# Patient Record
Sex: MALE | Race: WHITE | NOT HISPANIC OR LATINO | Employment: FULL TIME | URBAN - METROPOLITAN AREA
[De-identification: names, ages, dates, MRNs, and addresses within clinical notes are randomized per-mention and may not be internally consistent; named-entity substitution may affect disease eponyms.]

---

## 2022-08-15 ENCOUNTER — OFFICE VISIT (OUTPATIENT)
Dept: PODIATRY | Facility: CLINIC | Age: 22
End: 2022-08-15

## 2022-08-15 VITALS — HEIGHT: 73 IN | HEART RATE: 78 BPM | BODY MASS INDEX: 34.46 KG/M2 | WEIGHT: 260 LBS | OXYGEN SATURATION: 97 %

## 2022-08-15 DIAGNOSIS — M25.372 ANKLE INSTABILITY, LEFT: ICD-10-CM

## 2022-08-15 DIAGNOSIS — M25.572 ACUTE LEFT ANKLE PAIN: Primary | ICD-10-CM

## 2022-08-15 DIAGNOSIS — S93.402S MODERATE LEFT ANKLE SPRAIN, SEQUELA: ICD-10-CM

## 2022-08-15 PROCEDURE — 99203 OFFICE O/P NEW LOW 30 MIN: CPT | Performed by: PODIATRIST

## 2022-08-15 NOTE — PROGRESS NOTES
"08/15/2022  Foot and Ankle Surgery - New Patient   Provider: Dr. González Levi DPM  Location: [facility]    Subjective:  Og Bonilla is a 21 y.o. male.     Chief Complaint   Patient presents with   • Left Ankle - Pain   • Initial Evaluation     No pcp        HPI: The patient is a 21 year old male who presents today with left ankle pain. He reports weakness in his left ankle. He notes he has rolled his ankle approximately 10 times over the past 2 weeks, he notes this primarily occurs when he is \"jumping down from things\". He works with semi trucks and trailers. He noticed the onset of his symptoms about a month ago, he was playing friIcaruse, jumped up to catch it and landed wrong on his left ankle. He denies any ankle sprains or pain prior to this incident. The patient was evaluated at the urgent care on 06/20/2022, he had a x-ray of his left ankle. Based on the x-ray from the urgent care, there does not appear to be any fractures or dislocations. He reports when he initially injured his ankle he had bruising, swelling, pain, and difficulty ambulating. The patient wears an \"Ace brace\" and work boots that lace up above his ankle. He denies any significant pain on a daily basis.     No Known Allergies    History reviewed. No pertinent past medical history.    History reviewed. No pertinent surgical history.    History reviewed. No pertinent family history.    Social History     Socioeconomic History   • Marital status: Single   Tobacco Use   • Smoking status: Never Smoker   • Smokeless tobacco: Never Used   Vaping Use   • Vaping Use: Never used   Substance and Sexual Activity   • Alcohol use: Not Currently   • Drug use: Never   • Sexual activity: Defer        No current outpatient medications on file prior to visit.     No current facility-administered medications on file prior to visit.       Review of Systems:  General: Denies fever, chills, fatigue, and weakness.  Eyes: Denies vision loss, blurry " "vision, and excessive redness.  ENT: Denies hearing issues and difficulty swallowing.  Cardiovascular: Denies palpitations, chest pain, or syncopal episodes.  Respiratory: Denies shortness of breath, wheezing, and coughing.  GI: Denies abdominal pain, nausea, and vomiting.   : Denies frequency, hematuria, and urgency.  Musculoskeletal: Denies muscle cramps, joint pains, and stiffness.  Derm: Denies rash, open wounds, or suspicious lesions.  Neuro: Denies headaches, numbness, loss of coordination, and tremors.  Psych: Denies anxiety and depression.  Endocrine: Denies temperature intolerance and changes in appetite.  Heme: Denies bleeding disorders or abnormal bruising.     Objective   Pulse 78   Ht 185.4 cm (73\")   Wt 118 kg (260 lb)   SpO2 97%   BMI 34.30 kg/m²     Foot/Ankle Exam:       General:   Appearance: appears stated age and healthy    Orientation: AAOx3    Affect: appropriate      VASCULAR      Right Foot Vascularity   Normal vascular exam    Dorsalis pedis:  2+  Posterior tibial:  2+  Skin Temperature: warm    Edema Grading:  None  CFT:  < 3 seconds  Pedal Hair Growth:  Present  Varicosities: none       Left Foot Vascularity   Normal vascular exam    Dorsalis pedis:  2+  Posterior tibial:  2+  Skin Temperature: warm    Edema Grading:  None  CFT:  < 3 seconds  Pedal Hair Growth:  Present  Varicosities: none        NEUROLOGIC     Right Foot Neurologic   Light touch sensation:  Normal  Hot/Cold sensation: normal    Achilles reflex:  2+     Left Foot Neurologic   Light touch sensation:  Normal  Hot/cold sensation: normal    Achilles reflex:  2+     MUSCULOSKELETAL      Right Foot Musculoskeletal   Ecchymosis:  None  Arch:  Normal     Left Foot Musculoskeletal   Ecchymosis:  None  Arch:  Normal     MUSCLE STRENGTH     Right Foot Muscle Strength   Normal strength    Foot dorsiflexion:  5  Foot plantar flexion:  5  Foot inversion:  5  Foot eversion:  5     Left Foot Muscle Strength   Normal strength    Foot " dorsiflexion:  5  Foot plantar flexion:  5  Foot inversion:  5  Foot eversion:  5     DERMATOLOGIC     Right Foot Dermatologic   Skin: skin intact    Nails comment:  Nails 1-5     Left Foot Dermatologic   Skin: skin intact    Nails comment:  Nails 1-5     TESTS     Right Foot Tests   Anterior drawer: negative    Varus tilt: negative       Left Foot Tests   Anterior drawer: positive    Varus tilt: positive        Left Foot Additional Comments: Mild instability noted with anterior drawer testing. Discomfort with palpation involving the anterolateral aspect of the ankle. No gross deformity.       Assessment & Plan   Diagnoses and all orders for this visit:    1. Acute left ankle pain (Primary)  -     Ambulatory Referral to Physical Therapy    2. Ankle instability, left    3. Moderate left ankle sprain, sequela      Patient is a 21-year-old male that presents with left ankle pain.  Previous imaging was independently reviewed showing no obvious osseous abnormalities.  Clinically, patient has discomfort to the anterior lateral aspect of the ankle.  Symptoms are noticed with increased activity.  He does have instability with anterior drawer and talar tilt testing.  I discussed the diagnosis and treatment options.  I have recommended that we proceed with a lace up ankle brace for added stability.  I would also like him to obtain a pair of over-the-counter arch supports and wear on a daily basis.  I do feel that he would benefit from formal physical therapy.  Referral has been made.  I would like him to start at home stretching and manual therapy exercises.  We discussed rice therapy and proper use of OTC anti-inflammatories.  I would like to see him in 6 weeks for reevaluation.  Greater than 30 minutes was spent before, during, and after evaluation for patient care.    Orders Placed This Encounter   Procedures   • Ambulatory Referral to Physical Therapy     Referral Priority:   Routine     Referral Type:   Physical Therapy      Referral Reason:   Specialty Services Required     Requested Specialty:   Physical Therapy     Number of Visits Requested:   1        Note is dictated utilizing voice recognition software. Unfortunately this leads to occasional typographical errors. I apologize in advance if the situation occurs. If questions occur please do not hesitate to call our office.    Transcribed from ambient dictation for GEORGIA Levi DPM by Emma Maier.  08/15/22   09:01 EDT    Patient verbalized consent to the visit recording.  I have personally performed the services described in this document as transcribed by the above individual, and it is both accurate and complete.  GEORGIA Levi DPM  8/16/2022  07:34 EDT

## 2022-08-25 ENCOUNTER — TREATMENT (OUTPATIENT)
Dept: PHYSICAL THERAPY | Facility: CLINIC | Age: 22
End: 2022-08-25

## 2022-08-25 DIAGNOSIS — M25.572 ACUTE LEFT ANKLE PAIN: Primary | ICD-10-CM

## 2022-08-25 PROCEDURE — 97110 THERAPEUTIC EXERCISES: CPT | Performed by: PHYSICAL THERAPIST

## 2022-08-25 PROCEDURE — 97161 PT EVAL LOW COMPLEX 20 MIN: CPT | Performed by: PHYSICAL THERAPIST

## 2022-08-25 NOTE — PROGRESS NOTES
"Physical Therapy Initial Evaluation and Plan of Care    Patient: Og Bonilla   : 2000  Diagnosis/ICD-10 Code:  Acute left ankle pain [M25.572]  Referring practitioner: GEORGIA Levi DPM  Date of Initial Visit: 2022  Today's Date: 2022  Patient seen for 1 sessions           Subjective Questionnaire: LEFS: 71/80 = 89% functional ability       Subjective Evaluation    History of Present Illness  Mechanism of injury: Pt reports recurrent L ankle sprains. Pt states that he was throwing frisbee and jumped to get it a few months ago. Someone jumped on his back and he landed on uneven ground and ankle rolled inward. No previous h/o ankle sprains or injuries in the past. Pt states that he just keeps re-spraining/injuring the ankle since then. Pt can roll the ankle even when he is just walking sometimes. Played frisbee yesterday and ankle didn't roll on him at all. No pain in the ankle today. Pt hasn't had pain in the ankle for ~2 weeks. Ankle is rolling less but still happening 2-3x per week. No numbness or tingling. Doesn't feel much weakness in the ankle. 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 Has rolled ankle a couple times with that but tries to be careful. He does try to land on the other foot more and compensate for the L side. Pt also likes to fish but no issues with that. Pt had x-ray performed with showed no significant findings. Was given lace up brace from Podiatrist and he wears it at work which helps.     Pt also reports B knee pain that's been going on since beginning of this year. No specific DARIAN. R knee is hurting worse. It's just been slowly getting worse. Then yesterday felt like the R knee \"popped out\" after playing frisbee and then it got really stiff.     Pain  Current pain ratin  At best pain ratin  At worst pain ratin  Quality: sharp  Alleviating factors: Goes away on it's " own.  Aggravating factors: ambulation    Social Support  Lives in: one-story house    Diagnostic Tests  X-ray: normal           No past medical history on file.      Objective          Palpation     Additional Palpation Details  No tenderness around the ankle     Tenderness   Left Ankle/Foot   No tenderness.     Neurological Testing     Sensation     Ankle/Foot   Left Ankle/Foot   Intact: light touch    Right Ankle/Foot   Intact: light touch     Active Range of Motion   Left Ankle/Foot   Dorsiflexion (ke): 5 degrees   Plantar flexion: 70 degrees   Inversion: 50 degrees with pain  Eversion: 10 degrees     Right Ankle/Foot   Dorsiflexion (ke): 0 degrees   Plantar flexion: 68 degrees   Inversion: 60 degrees   Eversion: 15 degrees     Additional Active Range of Motion Details  Min pain with inversion end range     Joint Play   Left Ankle/Foot  Hypomobile in the talocrural joint and midfoot.     Right Ankle/Foot  Joints within functional limits are the talocrural joint and midfoot.     Strength/Myotome Testing     Left Ankle/Foot   Dorsiflexion: 5  Plantar flexion: 5  Inversion: 4+  Eversion: 4+  Great toe flexion: 4+  Great toe extension: 4+    Right Ankle/Foot   Normal strength    Tests   Left Ankle/Foot   Positive for inversion talar tilt.       Access Code: [email or web address]  URL: [email or web address]  Date: 08/25/2022  Prepared by: Alicja Vanessa    Exercises  Arch Lifting - 2 x daily - 10 reps - 5 sec hold  Seated Toe Curl - 2 x daily - 10 reps - 5 sec hold  Seated Ankle Plantarflexion with Resistance - 1 x daily - 15 reps - 3 sec hold  Seated Ankle Dorsiflexion, Inversion and Eversion with Resistance - 1 x daily - 15 reps - 3 sec hold  Seated Gastroc Stretch with Strap - 2 x daily - 3 reps - 30 sec hold  Supine Bridge - 2 x daily - 15 reps - 3 sec hold    Assessment & Plan     Assessment  Impairments: abnormal gait, abnormal muscle firing, abnormal or restricted ROM, activity intolerance, impaired balance,  impaired physical strength, lacks appropriate home exercise program, pain with function and safety issue  Functional Limitations: lifting, walking and standing  Assessment details: Pt is a 21 year old male with c/o recurrent ankle sprains in the L ankle with increased pain and instability. Pt demonstrates some weakness in the ankle stabilizers as well as foot intrinsics. Pt displays hypomobility with post glide in talocrural jt as well as limitations in rearfoot and midfoot on the L. Mod tightness of the gastroc B. Pain with end range inversion. Functional limitations include walking (even and uneven surfaces), running, jumping, work tasks. Pt will benefit from PT in order to address limitations and continue to progress overall function.   Prognosis: good    Goals  Plan Goals: STG (3 weeks):  Pt will demonstrate increased activation of foot intrinsics during amb and other activities/exercises to decrease load on ankle.   Pt will display improved ROM of L ankle to equal the R with no pain to assist with functional tasks.     LTG (6 weeks):  Pt will be independent with home exercises to assist with improved strength and continue to improve function.   Pt will demonstrate increased score on the LEFS to 95% to demonstrate decreased overall impairment.   Pt will be able to perform jumping tasks with little to no instability in the foot/ankle to assist with work tasks.   Pt will demonstrate improved ankle strength to 5/5 overall to assist with function.      Plan  Therapy options: will be seen for skilled therapy services  Planned modality interventions: cryotherapy, electrical stimulation/Russian stimulation, TENS and thermotherapy (hydrocollator packs)  Planned therapy interventions: abdominal trunk stabilization, ADL retraining, balance/weight-bearing training, body mechanics training, flexibility, functional ROM exercises, gait training, home exercise program, joint mobilization, manual therapy, motor coordination  training, neuromuscular re-education, soft tissue mobilization, spinal/joint mobilization, strengthening, stretching and therapeutic activities  Frequency: 1x week  Duration in weeks: 6  Treatment plan discussed with: patient        History # of Personal Factors and/or Comorbidities: LOW (0)  Examination of Body System(s): # of elements: LOW (1-2)  Clinical Presentation: STABLE   Clinical Decision Making: LOW       Eval:  Low Eval     20     Mins  03932  Mod Eval          Mins  42166  High Eval                            Mins  28759    Timed:         Manual Therapy:         mins  92630;     Therapeutic Exercise:    23     mins  22201;     Neuromuscular Eran:        mins  03292;    Therapeutic Activity:          mins  13865;     Gait Training:           mins  43200;       Un-Timed:  Electrical Stimulation:         mins  77841 ( );  Traction          mins 75977      Timed Treatment:   23   mins   Total Treatment:     43   mins    PT SIGNATURE: Alicja Vanessa PT, DPT, OCS           [place] License # [id number]           [place] License # [id number]    DATE TREATMENT INITIATED: 8/25/2022    Initial Certification  Certification Period: 11/23/2022  I certify that the therapy services are furnished while this patient is under my care.  The services outlined above are required by this patient, and will be reviewed every 90 days.     PHYSICIAN: GEORGIA Levi DPM      DATE:     Please sign and return via fax to [phone].. Thank you, [facility].

## 2022-09-14 ENCOUNTER — TREATMENT (OUTPATIENT)
Dept: PHYSICAL THERAPY | Facility: CLINIC | Age: 22
End: 2022-09-14

## 2022-09-14 DIAGNOSIS — M25.572 ACUTE LEFT ANKLE PAIN: Primary | ICD-10-CM

## 2022-09-14 PROCEDURE — 97112 NEUROMUSCULAR REEDUCATION: CPT | Performed by: PHYSICAL THERAPIST

## 2022-09-14 PROCEDURE — 97110 THERAPEUTIC EXERCISES: CPT | Performed by: PHYSICAL THERAPIST

## 2022-09-14 NOTE — PROGRESS NOTES
Physical Therapy Daily Note    Patient: Og Bonilla   : 2000  Diagnosis/ICD-10 Code:  Acute left ankle pain [M25.572]  Referring practitioner: GEORGIA Levi DPM  Date of Initial Visit: 2022  Today's Date: 2022  Patient seen for 2 sessions                                                                                                                                                                                                                                                                                                                               VISIT#: 2/6 sessions authorized per POC (Recert due 2022)     Precautions/Restrictions: None    Subjective  Og Bonilla reports that his ankle has been doing okay, just sore. Exercises are going well at home with no increased pain or soreness with them. He has been doing them most days of the week, but not everyday.       Objective  Min tenderness lateral ankle around lateral malleolus   No antalgia noted with amb    See Exercise, Manual, and Modality Logs for complete treatment.     Home Exercises  Access Code: [email or web address]  URL: [email or web address]  Date: 2022  Prepared by: Alicja Vanessa    Exercises  Clamshell - 4 x weekly - 15 reps - 3 sec hold    Assessment/Plan   Pt demonstrates good progress with activation of foot intrinsics with little difficulty noted during exercises this date. Progressed resistance band to green and pt able to perform well without increased pain or soreness. Added clamshells for home program to improve hip strength.     Goals  STG (3 weeks):  Pt will demonstrate increased activation of foot intrinsics during amb and other activities/exercises to decrease load on ankle.   Pt will display improved ROM of L ankle to equal the R with no pain to assist with functional tasks.     LTG (6 weeks):  Pt will be independent with home exercises to assist with improved strength and continue to improve  function.   Pt will demonstrate increased score on the LEFS to 95% to demonstrate decreased overall impairment.   Pt will be able to perform jumping tasks with little to no instability in the foot/ankle to assist with work tasks.   Pt will demonstrate improved ankle strength to 5/5 overall to assist with function.      Progress per Plan of Care and Progress strengthening /stabilization /functional activity            Timed:         Manual Therapy:         mins  16993;     Therapeutic Exercise:    20     mins  01976;     Neuromuscular Eran:    10    mins  64371;    Therapeutic Activity:          mins  23395;     Gait Training:           mins  22750;     Ultrasound:          mins  82912;    Ionto                                   mins   52716  Self Care                            mins   13475    Un-Timed:  Electrical Stimulation:         mins  22239 ( );  Traction          mins 63795    Timed Treatment:  23    mins   Total Treatment:     23   mins    Alicja Vanessa PT, DPT, OCS     [place] License # [id number]     [place] License # [id number]

## 2022-09-19 ENCOUNTER — TREATMENT (OUTPATIENT)
Dept: PHYSICAL THERAPY | Facility: CLINIC | Age: 22
End: 2022-09-19

## 2022-09-19 DIAGNOSIS — M25.572 ACUTE LEFT ANKLE PAIN: Primary | ICD-10-CM

## 2022-09-19 PROCEDURE — 97112 NEUROMUSCULAR REEDUCATION: CPT | Performed by: PHYSICAL THERAPIST

## 2022-09-19 PROCEDURE — 97110 THERAPEUTIC EXERCISES: CPT | Performed by: PHYSICAL THERAPIST

## 2022-09-19 NOTE — PROGRESS NOTES
Physical Therapy Daily Note    Patient: Og Bonilla   : 2000  Diagnosis/ICD-10 Code:  Acute left ankle pain [M25.572]  Referring practitioner: GEORGIA Levi DPM  Date of Initial Visit: 2022  Today's Date: 2022  Patient seen for 3 sessions                                                                                                                                                                                                                                                                                                                               VISIT#: 3/6 sessions authorized per POC (Recert due 2022)     Precautions/Restrictions: None    Subjective  Og Bonilla reports that he is doing fine today. Little soreness but not sure why. Nothing today. He did go kayaking and camping over the weekend.       Objective  Min tenderness lateral ankle around lateral malleolus   No antalgia noted with amb    See Exercise, Manual, and Modality Logs for complete treatment.     Home Exercises:  SLS and Y taps     Assessment/Plan   Pt demonstrates good progress with overall strength and stability of the L ankle. Able to perform exercises for foot intrinsics more easily as well. Pt is making good progress overall. Plan to d/c pt at next visit as long as he is still doing well.     Goals  STG (3 weeks):  Pt will demonstrate increased activation of foot intrinsics during amb and other activities/exercises to decrease load on ankle.   Pt will display improved ROM of L ankle to equal the R with no pain to assist with functional tasks.     LTG (6 weeks):  Pt will be independent with home exercises to assist with improved strength and continue to improve function.   Pt will demonstrate increased score on the LEFS to 95% to demonstrate decreased overall impairment.   Pt will be able to perform jumping tasks with little to no instability in the foot/ankle to assist with work tasks.   Pt will  demonstrate improved ankle strength to 5/5 overall to assist with function.      Progress per Plan of Care and Progress strengthening /stabilization /functional activity            Timed:         Manual Therapy:         mins  89803;     Therapeutic Exercise:    23     mins  80699;     Neuromuscular Eran:    9    mins  94928;    Therapeutic Activity:          mins  87250;     Gait Training:           mins  60297;     Ultrasound:          mins  49198;    Ionto                                   mins   34025  Self Care                            mins   69368    Un-Timed:  Electrical Stimulation:         mins  30816 ( );  Traction          mins 76496    Timed Treatment:  32    mins   Total Treatment:     32   mins    Alicja Vanessa, PT, DPT, OCS     [place] License # [id number]     [place] License # [id number]

## 2022-09-20 ENCOUNTER — OFFICE VISIT (OUTPATIENT)
Dept: ORTHOPEDIC SURGERY | Facility: CLINIC | Age: 22
End: 2022-09-20

## 2022-09-20 VITALS — WEIGHT: 260 LBS | OXYGEN SATURATION: 96 % | BODY MASS INDEX: 34.46 KG/M2 | HEART RATE: 76 BPM | HEIGHT: 73 IN

## 2022-09-20 DIAGNOSIS — M25.561 RIGHT KNEE PAIN, UNSPECIFIED CHRONICITY: Primary | ICD-10-CM

## 2022-09-20 PROCEDURE — 99203 OFFICE O/P NEW LOW 30 MIN: CPT | Performed by: FAMILY MEDICINE

## 2022-09-20 RX ORDER — MELOXICAM 15 MG/1
15 TABLET ORAL DAILY
Qty: 30 TABLET | Refills: 3 | Status: SHIPPED | OUTPATIENT
Start: 2022-09-20 | End: 2022-10-20

## 2022-09-20 RX ORDER — IBUPROFEN 200 MG
200 TABLET ORAL EVERY 6 HOURS PRN
COMMUNITY
End: 2022-10-20

## 2022-09-20 NOTE — PROGRESS NOTES
"Primary Care Sports Medicine Office Visit Note     Patient ID: Og Bonilla is a 22 y.o. male.    Chief Complaint:  Chief Complaint   Patient presents with   • Right Knee - Pain     HPI:    Mr. Og Bonilla is a 22 y.o. male. The patient presents to the clinic today for evaluation of right knee pain. He is accompanied by his father.    The patient reports he has been experiencing anterior right knee pain for approximately 1 year. He states he was playing a frisbee game approximately 1 month ago after which he developed stiffness and swelling in the right knee. The patient describes a subluxation event of the right knee approximately one hour following the frisbee game. He denies any known injury. He denies any twisting or torquing event. He denies any popping or clicking. The patient reports occasional instability in the right knee. He had an x-ray of his right knee on 08/29/2022.    History reviewed. No pertinent past medical history.    History reviewed. No pertinent surgical history.    History reviewed. No pertinent family history.  Social History     Occupational History   • Not on file   Tobacco Use   • Smoking status: Never Smoker   • Smokeless tobacco: Never Used   Vaping Use   • Vaping Use: Never used   Substance and Sexual Activity   • Alcohol use: Not Currently   • Drug use: Never   • Sexual activity: Defer      Review of Systems   Constitutional: Negative for activity change and fever.   Respiratory: Negative for cough and shortness of breath.    Cardiovascular: Negative for chest pain.   Gastrointestinal: Negative for constipation, diarrhea, nausea and vomiting.   Musculoskeletal: Positive for arthralgias.   Skin: Negative for color change and rash.   Neurological: Negative for weakness.   Hematological: Does not bruise/bleed easily.     Objective:    Pulse 76   Ht 185.4 cm (73\")   Wt 118 kg (260 lb)   SpO2 96%   BMI 34.30 kg/m²     Physical Examination:  Physical Exam  Vitals and " nursing note reviewed.   Constitutional:       General: He is not in acute distress.     Appearance: He is well-developed. He is not diaphoretic.   HENT:      Head: Normocephalic and atraumatic.   Eyes:      Conjunctiva/sclera: Conjunctivae normal.   Pulmonary:      Effort: Pulmonary effort is normal. No respiratory distress.   Musculoskeletal:      Comments: Right knee examination yields no tenderness to palpation. Range of motion is full from 0 to 130 degrees. There is negative varus and valgus stress tests, negative Lachman, negative medial and lateral meniscal testing in the form of Murray's. There is moderate tenderness to palpation of the patellar tendon and negative patellar grind testing.   Skin:     General: Skin is warm.      Capillary Refill: Capillary refill takes less than 2 seconds.   Neurological:      Mental Status: He is alert.          Ortho Exam   See above    Imaging and other tests:   Three view XR of the right knee dated 8/29/2022 yields no acute bony abnormality.    Assessment and Plan:    1. Right knee pain, unspecified chronicity  - meloxicam (MOBIC) 15 MG tablet; Take 1 tablet by mouth Daily.  Dispense: 30 tablet; Refill: 3    2.  Patellar tendinopathy of the right knee    - I discussed patellar tendon strap brace, anti-inflammatory in the form of meloxicam, and home exercise program with the patient and his father today. RTC in 4 weeks if no improvement.      Transcribed from ambient dictation for Quinn Curtis II,  by Jaylin De Guzman.  09/20/22   17:23 EDT    Patient verbalized consent to the visit recording.    Disclaimer: Please note that areas of this note were completed with computer voice recognition software.  Quite often unanticipated grammatical, syntax, homophones, and other interpretive errors are inadvertently transcribed by the computer software. Please excuse any errors that have escaped final proofreading.

## 2022-09-26 ENCOUNTER — OFFICE VISIT (OUTPATIENT)
Dept: PODIATRY | Facility: CLINIC | Age: 22
End: 2022-09-26

## 2022-09-26 VITALS — OXYGEN SATURATION: 97 % | HEIGHT: 73 IN | WEIGHT: 260 LBS | HEART RATE: 70 BPM | BODY MASS INDEX: 34.46 KG/M2

## 2022-09-26 DIAGNOSIS — M25.372 ANKLE INSTABILITY, LEFT: ICD-10-CM

## 2022-09-26 DIAGNOSIS — S93.402S MODERATE LEFT ANKLE SPRAIN, SEQUELA: ICD-10-CM

## 2022-09-26 DIAGNOSIS — M25.572 ACUTE LEFT ANKLE PAIN: Primary | ICD-10-CM

## 2022-09-26 PROCEDURE — 99212 OFFICE O/P EST SF 10 MIN: CPT | Performed by: PODIATRIST

## 2022-09-26 NOTE — PROGRESS NOTES
"09/26/2022  Foot and Ankle Surgery - Established Patient/Follow-up  Provider: Dr. González Levi DPM  Location: [facility]    Subjective:  Og Bonilla is a 22 y.o. male.     Chief Complaint   Patient presents with   • Left Ankle - Ankle Injury   • Follow-up     No known pcp        HPI: The patient is a 22-year-old male who presents to the office today for a follow-up regarding his left ankle.    The patient states he was last seen approximately 6 weeks ago; given a lace up ankle brace and exercises to perform. He states the inserts have made a difference in his symptoms. The patient states that he is 75 to 100 percent improved. He states he is no longer wearing the brace.    He reports he is a  and is on his feet throughout the day      No Known Allergies    Current Outpatient Medications on File Prior to Visit   Medication Sig Dispense Refill   • ibuprofen (ADVIL,MOTRIN) 200 MG tablet Take 200 mg by mouth Every 6 (Six) Hours As Needed for Mild Pain.     • meloxicam (MOBIC) 15 MG tablet Take 1 tablet by mouth Daily. 30 tablet 3     No current facility-administered medications on file prior to visit.       Objective   Pulse 70   Ht 185.4 cm (73\")   Wt 118 kg (260 lb)   SpO2 97%   BMI 34.30 kg/m²     Foot/Ankle Exam:       General:   Appearance: appears stated age and healthy    Orientation: AAOx3    Affect: appropriate      VASCULAR      Right Foot Vascularity   Normal vascular exam    Dorsalis pedis:  2+  Posterior tibial:  2+  Skin Temperature: warm    Edema Grading:  None  CFT:  < 3 seconds  Pedal Hair Growth:  Present  Varicosities: none       Left Foot Vascularity   Normal vascular exam    Dorsalis pedis:  2+  Posterior tibial:  2+  Skin Temperature: warm    Edema Grading:  None  CFT:  < 3 seconds  Pedal Hair Growth:  Present  Varicosities: none        NEUROLOGIC     Right Foot Neurologic   Light touch sensation:  Normal  Hot/Cold sensation: normal    Achilles reflex:  2+     Left " Foot Neurologic   Light touch sensation:  Normal  Hot/cold sensation: normal    Achilles reflex:  2+     MUSCULOSKELETAL      Right Foot Musculoskeletal   Ecchymosis:  None  Arch:  Normal     Left Foot Musculoskeletal   Ecchymosis:  None  Arch:  Normal     MUSCLE STRENGTH     Right Foot Muscle Strength   Normal strength    Foot dorsiflexion:  5  Foot plantar flexion:  5  Foot inversion:  5  Foot eversion:  5     Left Foot Muscle Strength   Normal strength    Foot dorsiflexion:  5  Foot plantar flexion:  5  Foot inversion:  5  Foot eversion:  5     DERMATOLOGIC     Right Foot Dermatologic   Skin: skin intact    Nails comment:  Nails 1-5     Left Foot Dermatologic   Skin: skin intact    Nails comment:  Nails 1-5     TESTS     Right Foot Tests   Anterior drawer: negative    Varus tilt: negative       Left Foot Tests   Anterior drawer: positive    Varus tilt: positive        Left Foot Additional Comments: Less discomfort with palpation to the anterolateral aspect of the ankle. No progressive deformity or instability.      Assessment & Plan   Diagnoses and all orders for this visit:    1. Acute left ankle pain (Primary)    2. Ankle instability, left  -     XR Ankle 3+ View Left    3. Moderate left ankle sprain, sequela      - Discussed with the patient that when there is a soft tissue disruption, the body changes some and need to adapt. I explained that it does not always go back to what it was before; however there are things to do in order to attempt to improve the function and maintain support. I discussed I do  not think there is a great amount that needs to be done and he is moving in the right direction. I explained if he can keep himself supported with exercise he will continue to gain strength. I recommend wearing decent shoe with  the inserts and watching his activities. If the symptoms start to bother him, we can do physical therapy or do an MRI.    Orders Placed This Encounter   Procedures   • XR Ankle 3+ View  Left     Order Specific Question:   Reason for Exam:     Answer:   left ankle sprain lateral side of ankle rm 14 wb     Order Specific Question:   Does this patient have a diabetic monitoring/medication delivering device on?     Answer:   No     Order Specific Question:   Release to patient     Answer:   Routine Release          Note is dictated utilizing voice recognition software. Unfortunately this leads to occasional typographical errors. I apologize in advance if the situation occurs. If questions occur please do not hesitate to call our office.    Transcribed from ambient dictation for GEORGIA Levi DPM by  Lucina Yusuf.  09/26/22   09:31 EDT    Patient verbalized consent to the visit recording.  I have personally performed the services described in this document as transcribed by the above individual, and it is both accurate and complete.  GEORGIA Levi DPM  9/27/2022  07:23 EDT

## 2022-09-28 ENCOUNTER — TREATMENT (OUTPATIENT)
Dept: PHYSICAL THERAPY | Facility: CLINIC | Age: 22
End: 2022-09-28

## 2022-09-28 DIAGNOSIS — M25.572 ACUTE LEFT ANKLE PAIN: Primary | ICD-10-CM

## 2022-09-28 PROCEDURE — 97110 THERAPEUTIC EXERCISES: CPT | Performed by: PHYSICAL THERAPIST

## 2022-09-28 NOTE — PROGRESS NOTES
Physical Therapy Reassessment    Patient: Og Bonilla   : 2000  Diagnosis/ICD-10 Code:  Acute left ankle pain [M25.572]  Referring practitioner: GEORGIA Levi DPM  Date of Initial Visit: 2022  Today's Date: 2022  Patient seen for 4 sessions                                                                                                                                                                                                                                                                                                                               VISIT#: 4/6 sessions authorized per POC (Recert due 2022)     Precautions/Restrictions: None    Subjective  Og Bonilla reports that he is doing well. No soreness in the ankle lately. He's able to do home and work activities/tasks without pain or soreness in the ankle. Pt has been working on home exercises and they are going well. Pt feels comfortable with d/c with HEP this date.     Subjective Questionnaire: LEFS: 98% functional ability     Objective  No tenderness lateral ankle around lateral malleolus   No antalgia noted with amb  Ankle AROM: DF 10; PF 60; inversion 40; eversion 20 degrees without pain   Ankle strength: 5/5 in all planes with no pain     See Exercise, Manual, and Modality Logs for complete treatment.     Home Exercises:  No new exercises     Assessment/Plan   Pt demonstrates good progress with ankle ROM and strength to WNL on the L. Improved ability to ambulate and perform jumping at work with no increased pain or soreness. Pt is independent with HEP and has met all goals. Pt will be discharged at this time.     Goals  STG (3 weeks):  Pt will demonstrate increased activation of foot intrinsics during amb and other activities/exercises to decrease load on ankle. - met  Pt will display improved ROM of L ankle to equal the R with no pain to assist with functional tasks. - met    LTG (6 weeks):  Pt will be  independent with home exercises to assist with improved strength and continue to improve function. - met  Pt will demonstrate increased score on the LEFS to 95% to demonstrate decreased overall impairment. - met  Pt will be able to perform jumping tasks with little to no instability in the foot/ankle to assist with work tasks. - met  Pt will demonstrate improved ankle strength to 5/5 overall to assist with function. - met     D/c pt with HEP         Timed:         Manual Therapy:         mins  89482;     Therapeutic Exercise:   8     mins  57605;     Neuromuscular Eran:        mins  49382;    Therapeutic Activity:          mins  44625;     Gait Training:           mins  36808;     Ultrasound:          mins  98556;    Ionto                                   mins   83575  Self Care                            mins   06834    Un-Timed:  Electrical Stimulation:         mins  93336 ( );  Traction          mins 38633    Timed Treatment:  8    mins   Total Treatment:     8   mins    Alicja Vanessa, PT, DPT, OCS     [place] License # [id number]     [place] License # [id number]

## 2022-10-17 ENCOUNTER — HOSPITAL ENCOUNTER (EMERGENCY)
Facility: HOSPITAL | Age: 22
Discharge: HOME | End: 2022-10-17
Attending: EMERGENCY MEDICINE | Admitting: EMERGENCY MEDICINE

## 2022-10-17 VITALS
SYSTOLIC BLOOD PRESSURE: 132 MMHG | HEART RATE: 70 BPM | DIASTOLIC BLOOD PRESSURE: 74 MMHG | BODY MASS INDEX: 33.72 KG/M2 | TEMPERATURE: 98 F | OXYGEN SATURATION: 100 % | HEIGHT: 73 IN | RESPIRATION RATE: 16 BRPM | WEIGHT: 254.41 LBS

## 2022-10-17 DIAGNOSIS — L02.91 ABSCESS: Primary | ICD-10-CM

## 2022-10-17 PROCEDURE — 87070 CULTURE OTHR SPECIMN AEROBIC: CPT

## 2022-10-17 PROCEDURE — 87205 SMEAR GRAM STAIN: CPT

## 2022-10-17 PROCEDURE — 87077 CULTURE AEROBIC IDENTIFY: CPT

## 2022-10-17 PROCEDURE — 87186 SC STD MICRODIL/AGAR DIL: CPT

## 2022-10-17 PROCEDURE — 99284 EMERGENCY DEPT VISIT MOD MDM: CPT

## 2022-10-17 RX ORDER — AMOXICILLIN AND CLAVULANATE POTASSIUM 875; 125 MG/1; MG/1
1 TABLET, FILM COATED ORAL EVERY 12 HOURS
Qty: 14 TABLET | Refills: 0 | Status: SHIPPED | OUTPATIENT
Start: 2022-10-17 | End: 2022-10-20

## 2022-10-17 NOTE — DISCHARGE INSTRUCTIONS
Warm sitz bath several times a day.   Take antibiotics until complettion    Loose fitting cotton bottoms.     Follow up with general surgery    Return to the ED for new or  worsening symptoms

## 2022-10-17 NOTE — ED PROVIDER NOTES
"Subjective   History of Present Illness  Chief Complaint: \"I think I have a hemorrhoid\"      HPI: Patient is a 22-year-old male ambulating around the room during my initial evaluation, came in by private vehicle.  States that he feels he has had a hemorrhoid beginning approximately 6 days ago, he is attempted Preparation H over-the-counter without improvement.  He believes he has had hemorrhoids in the past.  He has had no fevers, nausea vomiting chills no rectal bleeding.    PCP: None on file        Review of Systems   Constitutional: Negative.    HENT: Negative.    Respiratory: Negative.    Cardiovascular: Negative.    Gastrointestinal: Positive for rectal pain. Negative for blood in stool, constipation, diarrhea and nausea.   Genitourinary: Negative.    Musculoskeletal: Negative.    Skin: Negative.    Neurological: Negative.    Hematological: Negative.    Psychiatric/Behavioral: Negative.        No past medical history on file.    No Known Allergies    No past surgical history on file.    No family history on file.    Social History     Socioeconomic History   • Marital status: Single   Tobacco Use   • Smoking status: Never   • Smokeless tobacco: Never   Vaping Use   • Vaping Use: Never used   Substance and Sexual Activity   • Alcohol use: Not Currently   • Drug use: Never   • Sexual activity: Defer           Objective   Physical Exam  Vitals reviewed. Exam conducted with a chaperone present.   Constitutional:       Appearance: He is obese. He is not ill-appearing or toxic-appearing.   HENT:      Head: Normocephalic.   Eyes:      Extraocular Movements: Extraocular movements intact.      Pupils: Pupils are equal, round, and reactive to light.   Cardiovascular:      Rate and Rhythm: Normal rate.      Pulses: Normal pulses.   Pulmonary:      Effort: Pulmonary effort is normal.   Abdominal:      General: Bowel sounds are normal.   Genitourinary:     Rectum: Tenderness present. No internal hemorrhoid. Normal anal " "tone.       Musculoskeletal:         General: Normal range of motion.      Cervical back: Neck supple. No rigidity.   Skin:     General: Skin is warm and dry.      Capillary Refill: Capillary refill takes less than 2 seconds.   Neurological:      General: No focal deficit present.      Mental Status: He is alert and oriented to person, place, and time. Mental status is at baseline.      Motor: No weakness.         Procedures            ED Course      /74 (BP Location: Right arm, Patient Position: Lying)   Pulse 70   Temp 98 °F (36.7 °C) (Oral)   Resp 16   Ht 185.4 cm (73\")   Wt 115 kg (254 lb 6.6 oz)   SpO2 100%   BMI 33.57 kg/m²   Labs Reviewed   WOUND CULTURE     Medications - No data to display  No radiology results for the last day                                       MDM  Number of Diagnoses or Management Options  Abscess  Diagnosis management comments: While in the emergency room above evaluation was completed.  Site appears open with no areas of fluctuance, does not extend into the rectum, externally I can feel all boarders.  I emphasized the importance of followin up with general surgery as well as keeping the area clean and dry.  Worsening signs and symptoms were discussed with verbal understanding.  Antibiotics were sent to his preferred pharmacy.  Wound culture obtained and pending at time of discharge.     I spoke with the patient at the bedside regarding their plan of care, discharge instruction, home care, prescriptions, indications to return to the emergency department, and importance follow-up.  We discussed test results at the bedside, including incidental abnormal labs, radiological findings, understands need for follow-up with primary care or specialist if indicated.     Pt is aware that discharge does not mean that nothing is wrong but it indicates no emergency is present and they must continue care with follow-up as given below or physician of their choice    Radiology Studies:  " Reviewed by myself, Read by Radiologist.  Chart review: No recent ER visits that are pertinent to today's complaints    Comorbidities: None reported     Differentials: thrombosed hemorrhoid, cellulitis not all inclusive of differentials considered    Appropriate PPE worn throughout the care of this patient.        Risk of Complications, Morbidity, and/or Mortality  Presenting problems: high    Patient Progress  Patient progress: stable      Final diagnoses:   Abscess       ED Disposition  ED Disposition     ED Disposition   Discharge    Condition   Stable    Comment   --             Fracisco Vallecillo MD  [address] [facility] [address]  [place] [address] [phone]    Call today  As needed, For wound re-check         Medication List      New Prescriptions    amoxicillin-clavulanate 875-125 MG per tablet  Commonly known as: AUGMENTIN  Take 1 tablet by mouth Every 12 (Twelve) Hours.           Where to Get Your Medications      These medications were sent to [facility]/pharmacy #[id number] - [place], [place] - [address] - [phone]  - [phone]   [address] [place] [address]    Hours: 24-hours Phone: [phone]   · amoxicillin-clavulanate 875-125 MG per tablet          [place], APRN  10/17/22 7347

## 2022-10-20 ENCOUNTER — OFFICE VISIT (OUTPATIENT)
Dept: SURGERY | Facility: CLINIC | Age: 22
End: 2022-10-20

## 2022-10-20 VITALS
HEART RATE: 65 BPM | OXYGEN SATURATION: 98 % | HEIGHT: 73 IN | SYSTOLIC BLOOD PRESSURE: 131 MMHG | BODY MASS INDEX: 33.4 KG/M2 | RESPIRATION RATE: 16 BRPM | TEMPERATURE: 98.7 F | WEIGHT: 252 LBS | DIASTOLIC BLOOD PRESSURE: 79 MMHG

## 2022-10-20 DIAGNOSIS — K64.5 THROMBOSED HEMORRHOIDS: Primary | ICD-10-CM

## 2022-10-20 LAB
BACTERIA SPEC AEROBE CULT: ABNORMAL
BACTERIA SPEC AEROBE CULT: ABNORMAL
GRAM STN SPEC: ABNORMAL
GRAM STN SPEC: ABNORMAL

## 2022-10-20 PROCEDURE — 99203 OFFICE O/P NEW LOW 30 MIN: CPT | Performed by: SURGERY

## 2022-10-20 RX ORDER — CEFDINIR 300 MG/1
CAPSULE ORAL
COMMUNITY
Start: 2022-10-20

## 2022-10-20 NOTE — PROGRESS NOTES
Patient wound culture resulted with E. coli. Susceptible to Cephalosporins (3rd gen). Patient was given Rx for amoxicillin-clavulanic acid. Therapy is insufficient coverage. Discussed with Dr. Sosa Brown. New prescription for cefdinir called to the patient's preferred pharmacy: [facility]. Spoke with patient who agreed with treatment plan.    Microbiology Results (last 10 days)       Procedure Component Value - Date/Time    Wound Culture - Wound, Buttock, Right [[id number]]  (Abnormal)  (Susceptibility) Collected: 10/17/22 1255    Lab Status: Final result Specimen: Wound from Buttock, Right Updated: 10/20/22 7652     Wound Culture Scant growth (1+) Escherichia coli      Scant growth (1+) Normal Skin Nakia     Gram Stain Moderate (3+) WBCs per low power field      Moderate (3+) Mixed bacterial morphotypes seen on Gram Stain    Susceptibility        Escherichia coli      ASIF      Ampicillin Resistant      Ampicillin + Sulbactam Resistant      Cefepime Susceptible      Ceftazidime Susceptible      Ceftriaxone Susceptible      Gentamicin Susceptible      Levofloxacin Intermediate      Piperacillin + Tazobactam Susceptible      Tetracycline Susceptible      Trimethoprim + Sulfamethoxazole Resistant                       Susceptibility Comments       Escherichia coli    Cefazolin sensitivity will not be reported for Enterobacteriaceae in non-urine isolates. If cefazolin is preferred, please call the microbiology lab to request an E-test.  With the exception of urinary-sourced infections, aminoglycosides should not be used as monotherapy.                       Tye Dixon, Pharmacy Intern  10/20/2022 10:26 EDT

## 2022-10-20 NOTE — PROGRESS NOTES
"Subjective   Og Bonilla is a 22 y.o. male.   Chief Complaint   Patient presents with   • Abscess     NP Ref [facility] ER, buttock abscess      /79 (BP Location: Left arm, Patient Position: Sitting, Cuff Size: Adult)   Pulse 65   Temp 98.7 °F (37.1 °C) (Infrared)   Resp 16   Ht 185.4 cm (73\")   Wt 114 kg (252 lb)   SpO2 98%   BMI 33.25 kg/m²     HISTORY OF PRESENT ILLNESS:  22-year-old gentleman referred to me after being seen in the emergency department with perirectal swelling.  He says that over the course of several days gradually swelled up causing increasing discomfort.  It felt like a fullness and was in the right side.  Gradually progressed to spontaneously ruptured evacuating mostly a bloody fluid.  He was seen in the emergency department for evaluation who put him on some antibiotics after taking a swab.  He has been switched on the antibiotics based on the cultures which grew out scant gram-negative's and normal skin avis.  In general he thinks he is feeling okay the swelling is improving he can still feel a firm bump but is not having significant drainage that persists.  Is not having any systemic symptoms like fevers or chills.  He has never had this before.  There is no family history of colon problems.  He does say that he probably runs on the constipated side and has not tried to modify his stool hygiene.      Outpatient Encounter Medications as of 10/20/2022   Medication Sig Dispense Refill   • cefdinir (OMNICEF) 300 MG capsule      • [DISCONTINUED] amoxicillin-clavulanate (AUGMENTIN) 875-125 MG per tablet Take 1 tablet by mouth Every 12 (Twelve) Hours. 14 tablet 0   • [DISCONTINUED] ibuprofen (ADVIL,MOTRIN) 200 MG tablet Take 200 mg by mouth Every 6 (Six) Hours As Needed for Mild Pain.     • [DISCONTINUED] meloxicam (MOBIC) 15 MG tablet Take 1 tablet by mouth Daily. 30 tablet 3     No facility-administered encounter medications on file as of 10/20/2022.         The following portions " of the patient's history were reviewed and updated as appropriate: allergies, current medications, past family history, past medical history, past social history, past surgical history and problem list.    Review of Systems  Complete review of systems has been obtained is positive for anal bleeding.  Objective     Physical Exam  Constitutional:       Appearance: Normal appearance.   HENT:      Head: Normocephalic and atraumatic.   Cardiovascular:      Rate and Rhythm: Normal rate.   Pulmonary:      Effort: Pulmonary effort is normal. No respiratory distress.   Genitourinary:     Comments: On rectal exam in the right posterior lateral location there is evidence of what looks like a thrombosed hemorrhoid that has spontaneously drained.  There is some firm indurated surrounding tissue without any fluctuance there is minimal tenderness to palpation there is no spontaneous drainage.  Skin:     General: Skin is warm.   Neurological:      General: No focal deficit present.      Mental Status: He is alert. Mental status is at baseline.           Assessment & Plan   Diagnoses and all orders for this visit:    1. Thrombosed hemorrhoids (Primary)    Unsure as the exact etiology of the event but this most likely was a thrombosed hemorrhoid.  Alternatives could have been a perirectal abscess.  No previous issues in this location.  We talked about good stool hygiene adding fiber to the diet plenty of fluids.  Sitz baths if recurrent issues.  Follow-up in about 2 months for repeat evaluation to see if this induration results.    Fracisco Vallecillo MD  10/20/2022  4:12 PM EDT    This note was created using Dragon Voice Recognition software.

## 2023-01-13 ENCOUNTER — DOCUMENTATION (OUTPATIENT)
Dept: PHYSICAL THERAPY | Facility: CLINIC | Age: 23
End: 2023-01-13
Payer: COMMERCIAL

## 2023-01-13 NOTE — PROGRESS NOTES
Discharge Summary  Discharge Summary from Physical Therapy Report      Date of Initial PT visit: 8/25/2022  Number of Visits Seen: 4     Discharge Status of Patient:   STG (3 weeks):  Pt will demonstrate increased activation of foot intrinsics during amb and other activities/exercises to decrease load on ankle. - met  Pt will display improved ROM of L ankle to equal the R with no pain to assist with functional tasks. - met    LTG (6 weeks):  Pt will be independent with home exercises to assist with improved strength and continue to improve function. - met  Pt will demonstrate increased score on the LEFS to 95% to demonstrate decreased overall impairment. - met  Pt will be able to perform jumping tasks with little to no instability in the foot/ankle to assist with work tasks. - met  Pt will demonstrate improved ankle strength to 5/5 overall to assist with function. - met     Goals: All Met    Discharge Plan: Continue with current home exercise program as instructed    Comments: Pt demonstrates good progress with ankle ROM and strength to WNL on the L. Improved ability to ambulate and perform jumping at work with no increased pain or soreness. Pt is independent with HEP and has met all goals. Pt will be discharged at this time.     Date of Discharge: 1/13/2023      Alicja Vanessa, PT, DPT, OCS     [place] License # [id number]     [place] License # [id number]